# Patient Record
Sex: FEMALE | Race: BLACK OR AFRICAN AMERICAN | NOT HISPANIC OR LATINO | URBAN - NONMETROPOLITAN AREA
[De-identification: names, ages, dates, MRNs, and addresses within clinical notes are randomized per-mention and may not be internally consistent; named-entity substitution may affect disease eponyms.]

---

## 2019-04-04 ENCOUNTER — IMPORTED ENCOUNTER (OUTPATIENT)
Dept: URBAN - NONMETROPOLITAN AREA CLINIC 1 | Facility: CLINIC | Age: 84
End: 2019-04-04

## 2019-04-04 PROBLEM — E11.9: Noted: 2019-04-04

## 2019-04-04 PROBLEM — H40.1133: Noted: 2019-04-04

## 2019-04-04 PROBLEM — Z94.7: Noted: 2019-04-04

## 2019-04-04 PROBLEM — Z96.1: Noted: 2019-10-28

## 2019-04-04 PROBLEM — Z94.7: Noted: 2019-10-28

## 2019-04-04 PROBLEM — E11.9: Noted: 2019-10-28

## 2019-04-04 PROBLEM — H16.421: Noted: 2019-10-28

## 2019-04-04 PROBLEM — Z96.1: Noted: 2019-04-04

## 2019-04-04 PROCEDURE — 99214 OFFICE O/P EST MOD 30 MIN: CPT

## 2019-04-04 PROCEDURE — 92015 DETERMINE REFRACTIVE STATE: CPT

## 2019-04-04 NOTE — PATIENT DISCUSSION
POAG OU - Severe Stage- IOP's OU are adequately controlled without medical management fllowing POAG Sx OU.- Recommend continue management without medication- Recommend follow-up in 3 months- Recommend OCT of the RNFL upon return. NIDDM s  OU-Stressed the importance of keeping blood sugars under control blood pressure under control and weight normalization and regular visits with PCP. -Explained the possible effects of poorly controlled diabetes and the damage that diabetes can cause to ocular health. -Patient to check HgbA1C.-Pt instructed to contact our office with any vision changes. Pseudophakia OU- Doing well no treatment currently indicated. 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

## 2019-07-29 ENCOUNTER — IMPORTED ENCOUNTER (OUTPATIENT)
Dept: URBAN - NONMETROPOLITAN AREA CLINIC 1 | Facility: CLINIC | Age: 84
End: 2019-07-29

## 2019-07-29 PROCEDURE — 99213 OFFICE O/P EST LOW 20 MIN: CPT

## 2019-07-29 PROCEDURE — 92133 CPTRZD OPH DX IMG PST SGM ON: CPT

## 2019-07-29 NOTE — PATIENT DISCUSSION
POAG OU - Severe Stage- IOP's OU are adequately controlled without medical management fllowing POAG Sx OU.- Recommend continue management without medication- Recommend follow-up in 3 monthsNIDDM s DR OU-Stressed the importance of keeping blood sugars under control blood pressure under control and weight normalization and regular visits with PCP. -Explained the possible effects of poorly controlled diabetes and the damage that diabetes can cause to ocular health. -Patient to check HgbA1C.-Pt instructed to contact our office with any vision changes. Pseudophakia OU- Doing well no treatment currently indicated. 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

## 2019-10-28 ENCOUNTER — IMPORTED ENCOUNTER (OUTPATIENT)
Dept: URBAN - NONMETROPOLITAN AREA CLINIC 1 | Facility: CLINIC | Age: 84
End: 2019-10-28

## 2019-10-28 PROCEDURE — 99212 OFFICE O/P EST SF 10 MIN: CPT

## 2019-10-28 NOTE — PATIENT DISCUSSION
POAG OU - Severe Stage- IOP's OU are adequately controlled without medical management fllowing POAG Sx OU.- Recommend continue management without medication- Recommend follow-up in 3 monthsNewly formed micropannus OD- Recommend discuss with Dr. Betsy Lutz on how he would like to manage micropannus. NIDDM s  OU-Stressed the importance of keeping blood sugars under control blood pressure under control and weight normalization and regular visits with PCP. -Explained the possible effects of poorly controlled diabetes and the damage that diabetes can cause to ocular health. -Patient to check HgbA1C.-Pt instructed to contact our office with any vision changes. Pseudophakia OU- Doing well no treatment currently indicated. 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

## 2020-08-20 ENCOUNTER — IMPORTED ENCOUNTER (OUTPATIENT)
Dept: URBAN - NONMETROPOLITAN AREA CLINIC 1 | Facility: CLINIC | Age: 85
End: 2020-08-20

## 2020-08-20 PROCEDURE — 99214 OFFICE O/P EST MOD 30 MIN: CPT

## 2020-08-20 NOTE — PATIENT DISCUSSION
POAG OU - Severe Stage- IOP's OU are adequately controlled without medical management fllowing POAG Sx OU.- Recommend continue management without medication- Recommend follow-up in 3 months. Newly formed micropannus OD- Recommend discuss with Dr. Tony Ontiveros on how he would like to manage micropannus. NIDDM s  OU-Stressed the importance of keeping blood sugars under control blood pressure under control and weight normalization and regular visits with PCP. -Explained the possible effects of poorly controlled diabetes and the damage that diabetes can cause to ocular health. -Patient to check HgbA1C.-Pt instructed to contact our office with any vision changes. Pseudophakia OU- Doing well no treatment currently indicated. 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

## 2020-09-17 ENCOUNTER — IMPORTED ENCOUNTER (OUTPATIENT)
Dept: URBAN - NONMETROPOLITAN AREA CLINIC 1 | Facility: CLINIC | Age: 85
End: 2020-09-17

## 2020-09-17 PROCEDURE — 92015 DETERMINE REFRACTIVE STATE: CPT

## 2020-09-17 NOTE — PATIENT DISCUSSION
POAG OU - Severe Stage- IOP's OU are adequately controlled without medical management fllowing POAG Sx OU.- Recommend continue management without medication- Recommend follow-up in 3 months. Newly formed micropannus OD- Recommend discuss with Dr. Manuel Hands on how he would like to manage micropannus. NIDDM s  OU-Stressed the importance of keeping blood sugars under control blood pressure under control and weight normalization and regular visits with PCP. -Explained the possible effects of poorly controlled diabetes and the damage that diabetes can cause to ocular health. -Patient to check HgbA1C.-Pt instructed to contact our office with any vision changes. Pseudophakia OU- Doing well no treatment currently indicated. 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

## 2020-09-29 NOTE — PATIENT DISCUSSION
New Prescription: moxifloxacin (moxifloxacin): drops: 0.5% 1 drop every two hours as directed into left eye 09-

## 2020-09-30 NOTE — PATIENT DISCUSSION
Continue: moxifloxacin (moxifloxacin): drops: 0.5% 1 drop every two hours as directed into left eye 09-

## 2020-11-23 ENCOUNTER — IMPORTED ENCOUNTER (OUTPATIENT)
Dept: URBAN - NONMETROPOLITAN AREA CLINIC 1 | Facility: CLINIC | Age: 85
End: 2020-11-23

## 2020-11-23 PROCEDURE — 92133 CPTRZD OPH DX IMG PST SGM ON: CPT

## 2020-11-23 PROCEDURE — 99214 OFFICE O/P EST MOD 30 MIN: CPT

## 2020-11-23 NOTE — PATIENT DISCUSSION
POAG OU - Severe Stage- IOP's OU are adequately controlled without medical management following POAG Sx OU.- Recommend continue management without medication- Recommend follow-up in 3 months. Newly formed micropannus OD- Recommend continued care with Dr. Carmelo Paredes. NIDDM s  OU-Stressed the importance of keeping blood sugars under control blood pressure under control and weight normalization and regular visits with PCP. -Explained the possible effects of poorly controlled diabetes and the damage that diabetes can cause to ocular health. -Patient to check HgbA1C.-Pt instructed to contact our office with any vision changes. Pseudophakia OU- Doing well no treatment currently indicated. 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

## 2021-03-09 ENCOUNTER — IMPORTED ENCOUNTER (OUTPATIENT)
Dept: URBAN - NONMETROPOLITAN AREA CLINIC 1 | Facility: CLINIC | Age: 86
End: 2021-03-09

## 2021-03-09 PROCEDURE — 99214 OFFICE O/P EST MOD 30 MIN: CPT

## 2021-03-09 NOTE — PATIENT DISCUSSION
POAG OU - Severe Stage- IOP's OU are adequately controlled without medical management following POAG Sx OU.- Recommend continue management without medication- Recommend follow-up in 6 months. Newly formed micropannus OD- Recommend continued care with Dr. Suzanne Clifford. NIDDM s  OU-Stressed the importance of keeping blood sugars under control blood pressure under control and weight normalization and regular visits with PCP. -Explained the possible effects of poorly controlled diabetes and the damage that diabetes can cause to ocular health. -Patient to check HgbA1C.-Pt instructed to contact our office with any vision changes. Pseudophakia OU- Doing well no treatment currently indicated. 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

## 2021-08-31 ENCOUNTER — IMPORTED ENCOUNTER (OUTPATIENT)
Dept: URBAN - NONMETROPOLITAN AREA CLINIC 1 | Facility: CLINIC | Age: 86
End: 2021-08-31

## 2021-08-31 PROBLEM — E11.9: Noted: 2021-08-31

## 2021-08-31 PROBLEM — Z96.1: Noted: 2021-08-31

## 2021-08-31 PROBLEM — Z94.7: Noted: 2021-08-31

## 2021-08-31 PROBLEM — H52.03: Noted: 2021-08-31

## 2021-08-31 PROBLEM — H40.1133: Noted: 2019-04-04

## 2021-08-31 PROCEDURE — 99214 OFFICE O/P EST MOD 30 MIN: CPT

## 2021-08-31 NOTE — PATIENT DISCUSSION
POAG OU - Severe StageDiscussed diagnosis in detail with patient. IOP's OU are adequately controlled without medical management following POAG Sx OU. UTO OCT ON today. IOP 12 OU. C/D's 0.85 OD 0.9 OS. Recommend continue management without medication per Dr. Mar Marr. Continue to monitor 1 year. NIDDM sans retinopathy OUDiscussed diagnosis with patient. Discussed the risk of diabetic damage of the retina with potential vision loss and the importance of routine follow-up. Emphasized strict blood sugar control. Continue to monitor. Corneal Transplant OSNo treatment inidcated recommend observation. Presbyopia OU Discussed refractive status with patient. New glasses Rx given today. Continue to monitor.; Dr's Notes: OCT ON 8/31/21(UTO)

## 2022-04-10 ASSESSMENT — VISUAL ACUITY
OS_SC: 20/70-1
OS_SC: 20/70-1
OS_SC: CF4'
OD_CC: 20/40-
OS_PH: 20/150
OD_SC: 20/40
OS_SC: 20/80-1
OD_SC: 20/40-2
OD_SC: 20/60
OD_SC: 20/60
OD_SC: 20/40-2
OS_SC: 20/200
OS_SC: 20/400
OD_SC: 20/60

## 2022-04-10 ASSESSMENT — TONOMETRY
OS_IOP_MMHG: 13
OD_IOP_MMHG: 9
OD_IOP_MMHG: 14
OS_IOP_MMHG: 14
OS_IOP_MMHG: 12
OS_IOP_MMHG: 12
OS_IOP_MMHG: 14
OD_IOP_MMHG: 12
OD_IOP_MMHG: 13
OS_IOP_MMHG: 12
OD_IOP_MMHG: 13
OD_IOP_MMHG: 12
OD_IOP_MMHG: 12
OD_IOP_MMHG: 10
OS_IOP_MMHG: 14

## 2022-08-18 ENCOUNTER — ESTABLISHED PATIENT (OUTPATIENT)
Dept: RURAL CLINIC 3 | Facility: CLINIC | Age: 87
End: 2022-08-18

## 2022-08-18 DIAGNOSIS — H40.1133: ICD-10-CM

## 2022-08-18 DIAGNOSIS — E11.9: ICD-10-CM

## 2022-08-18 PROCEDURE — 99213 OFFICE O/P EST LOW 20 MIN: CPT

## 2022-08-18 ASSESSMENT — VISUAL ACUITY: OS_CC: 20/100

## 2022-08-18 ASSESSMENT — TONOMETRY
OD_IOP_MMHG: 12
OS_IOP_MMHG: 11

## 2023-10-19 ENCOUNTER — EMERGENCY VISIT (OUTPATIENT)
Dept: RURAL CLINIC 3 | Facility: CLINIC | Age: 88
End: 2023-10-19

## 2023-10-19 DIAGNOSIS — H10.022: ICD-10-CM

## 2023-10-19 PROCEDURE — 99213 OFFICE O/P EST LOW 20 MIN: CPT

## 2025-03-11 ENCOUNTER — FOLLOW UP (OUTPATIENT)
Age: OVER 89
End: 2025-03-11

## 2025-03-11 DIAGNOSIS — H01.024: ICD-10-CM

## 2025-03-11 DIAGNOSIS — H40.1133: ICD-10-CM

## 2025-03-11 DIAGNOSIS — H01.021: ICD-10-CM

## 2025-03-11 PROCEDURE — 99213 OFFICE O/P EST LOW 20 MIN: CPT
